# Patient Record
Sex: FEMALE | Race: WHITE | NOT HISPANIC OR LATINO
[De-identification: names, ages, dates, MRNs, and addresses within clinical notes are randomized per-mention and may not be internally consistent; named-entity substitution may affect disease eponyms.]

---

## 2023-02-07 PROBLEM — Z00.00 ENCOUNTER FOR PREVENTIVE HEALTH EXAMINATION: Status: ACTIVE | Noted: 2023-02-07

## 2023-02-09 ENCOUNTER — APPOINTMENT (OUTPATIENT)
Dept: ORTHOPEDIC SURGERY | Facility: CLINIC | Age: 83
End: 2023-02-09
Payer: MEDICARE

## 2023-02-09 VITALS — BODY MASS INDEX: 27.6 KG/M2 | WEIGHT: 150 LBS | HEIGHT: 62 IN

## 2023-02-09 DIAGNOSIS — E78.00 PURE HYPERCHOLESTEROLEMIA, UNSPECIFIED: ICD-10-CM

## 2023-02-09 DIAGNOSIS — Z78.9 OTHER SPECIFIED HEALTH STATUS: ICD-10-CM

## 2023-02-09 DIAGNOSIS — Z87.891 PERSONAL HISTORY OF NICOTINE DEPENDENCE: ICD-10-CM

## 2023-02-09 DIAGNOSIS — I10 ESSENTIAL (PRIMARY) HYPERTENSION: ICD-10-CM

## 2023-02-09 DIAGNOSIS — Z86.69 PERSONAL HISTORY OF OTHER DISEASES OF THE NERVOUS SYSTEM AND SENSE ORGANS: ICD-10-CM

## 2023-02-09 DIAGNOSIS — E11.9 TYPE 2 DIABETES MELLITUS W/OUT COMPLICATIONS: ICD-10-CM

## 2023-02-09 DIAGNOSIS — Z86.39 PERSONAL HISTORY OF OTHER ENDOCRINE, NUTRITIONAL AND METABOLIC DISEASE: ICD-10-CM

## 2023-02-09 DIAGNOSIS — Z85.3 PERSONAL HISTORY OF MALIGNANT NEOPLASM OF BREAST: ICD-10-CM

## 2023-02-09 DIAGNOSIS — Z85.118 PERSONAL HISTORY OF OTHER MALIGNANT NEOPLASM OF BRONCHUS AND LUNG: ICD-10-CM

## 2023-02-09 PROCEDURE — 99214 OFFICE O/P EST MOD 30 MIN: CPT | Mod: 25

## 2023-02-09 PROCEDURE — 27520 TREAT KNEECAP FRACTURE: CPT

## 2023-02-09 PROCEDURE — L1833: CPT | Mod: KX,LT

## 2023-02-09 RX ORDER — ATORVASTATIN CALCIUM 80 MG/1
80 TABLET, FILM COATED ORAL
Refills: 0 | Status: ACTIVE | COMMUNITY

## 2023-02-09 RX ORDER — LEVOTHYROXINE SODIUM 0.11 MG/1
112 TABLET ORAL
Refills: 0 | Status: ACTIVE | COMMUNITY

## 2023-02-09 RX ORDER — UMECLIDINIUM BROMIDE AND VILANTEROL TRIFENATATE 62.5; 25 UG/1; UG/1
62.5-25 POWDER RESPIRATORY (INHALATION)
Refills: 0 | Status: ACTIVE | COMMUNITY

## 2023-02-09 RX ORDER — EZETIMIBE 10 MG/1
10 TABLET ORAL
Refills: 0 | Status: ACTIVE | COMMUNITY

## 2023-02-09 RX ORDER — SERTRALINE HYDROCHLORIDE 100 MG/1
100 TABLET, FILM COATED ORAL
Refills: 0 | Status: ACTIVE | COMMUNITY

## 2023-02-09 RX ORDER — INSULIN LISPRO 100 [IU]/ML
100 INJECTION, SOLUTION INTRAVENOUS; SUBCUTANEOUS
Refills: 0 | Status: ACTIVE | COMMUNITY

## 2023-02-09 RX ORDER — ROPINIROLE 4 MG/1
4 TABLET, FILM COATED ORAL
Refills: 0 | Status: ACTIVE | COMMUNITY

## 2023-02-09 RX ORDER — METOPROLOL SUCCINATE 50 MG/1
50 TABLET, EXTENDED RELEASE ORAL
Refills: 0 | Status: ACTIVE | COMMUNITY

## 2023-02-09 RX ORDER — CEPHALEXIN 500 MG/1
500 CAPSULE ORAL
Refills: 0 | Status: ACTIVE | COMMUNITY

## 2023-02-09 RX ORDER — INSULIN GLARGINE 300 U/ML
300 INJECTION, SOLUTION SUBCUTANEOUS
Refills: 0 | Status: ACTIVE | COMMUNITY

## 2023-02-09 NOTE — HISTORY OF PRESENT ILLNESS
[Sudden] : sudden [0] : 0 [8] : 8 [Burning] : burning [Dull/Aching] : dull/aching [Throbbing] : throbbing [Intermittent] : intermittent [Nothing helps with pain getting better] : Nothing helps with pain getting better [Lying in bed] : lying in bed [Retired] : Work status: retired [de-identified] : Patient presents today with left knee fracture after slipping and falling at home on 2/4/23. Went to [facility], had left knee xray and LE CT, was given brace and advised to follow up with orthopedic. Experiencing localized pain. Admits to having swelling. Taking Tylenol with no relief. Using a wheelchair for ambulation today, uses a walker at home.  [] : no [FreeTextEntry1] : left knee [FreeTextEntry3] : 2/4/23 [FreeTextEntry5] : slipping and falling at home [de-identified] : left knee xray and LE CT at [facility]

## 2023-02-09 NOTE — DATA REVIEWED
[CT Scan] : CT scan [Left] : left [Knee] : knee [Report was reviewed and noted in the chart] : The report was reviewed and noted in the chart [I independently reviewed and interpreted images and report] : I independently reviewed and interpreted images and report [FreeTextEntry1] : Comminuted, nondisplaced intra-articular fracture of the inferior aspect of the left patella.\par

## 2023-02-09 NOTE — ASSESSMENT
[FreeTextEntry1] : Comminuted, nondisplaced intra-articular fracture of the inferior aspect of the left patella.\par \par Patient given long hinged knee brace \par \par NWB left LE \par \par Recommend: - rest - ice - compression - elevation \par \par Follow up in 6 weeks

## 2023-02-12 ENCOUNTER — FORM ENCOUNTER (OUTPATIENT)
Age: 83
End: 2023-02-12

## 2023-02-14 ENCOUNTER — FORM ENCOUNTER (OUTPATIENT)
Age: 83
End: 2023-02-14

## 2023-02-19 ENCOUNTER — FORM ENCOUNTER (OUTPATIENT)
Age: 83
End: 2023-02-19

## 2023-03-23 ENCOUNTER — RESULT CHARGE (OUTPATIENT)
Age: 83
End: 2023-03-23

## 2023-03-23 ENCOUNTER — APPOINTMENT (OUTPATIENT)
Dept: ORTHOPEDIC SURGERY | Facility: CLINIC | Age: 83
End: 2023-03-23
Payer: MEDICARE

## 2023-03-23 VITALS — BODY MASS INDEX: 27.6 KG/M2 | HEIGHT: 62 IN | WEIGHT: 150 LBS

## 2023-03-23 PROCEDURE — 73562 X-RAY EXAM OF KNEE 3: CPT | Mod: LT

## 2023-03-23 PROCEDURE — 99024 POSTOP FOLLOW-UP VISIT: CPT

## 2023-03-23 RX ORDER — ACETAMINOPHEN AND CODEINE 300; 30 MG/1; MG/1
300-30 TABLET ORAL
Qty: 30 | Refills: 0 | Status: ACTIVE | COMMUNITY
Start: 2023-02-10 | End: 1900-01-01

## 2023-03-23 NOTE — ASSESSMENT
[FreeTextEntry1] : Comminuted, nondisplaced intra-articular fracture of the inferior aspect of the left patella.\par \par Patient has not used long hinged knee brace locked in extension as instructed. Patient did not follow recommendations to immobilize left knee. Patient has been bearing weight, extending, flexing. \par \par Patient will begin physical therapy.\par \par Patient going on vacation May 7th. Recommended patient RTO if pain returns prior to leaving.\par \par Follow up in 2 months

## 2023-03-23 NOTE — IMAGING
Temp:  [98 °F (36.7 °C)] 98 °F (36.7 °C)  Heart Rate:  [99] 99  Resp:  [22] 22  BP: (108)/(69) 108/69     [Left] : left knee [AP] : anteroposterior [Lateral] : lateral [FreeTextEntry9] : Fracture in acceptable alignment

## 2023-04-04 ENCOUNTER — FORM ENCOUNTER (OUTPATIENT)
Age: 83
End: 2023-04-04

## 2023-04-06 ENCOUNTER — FORM ENCOUNTER (OUTPATIENT)
Age: 83
End: 2023-04-06

## 2023-05-26 ENCOUNTER — APPOINTMENT (OUTPATIENT)
Dept: ORTHOPEDIC SURGERY | Facility: CLINIC | Age: 83
End: 2023-05-26
Payer: MEDICARE

## 2023-05-26 VITALS — WEIGHT: 150 LBS | BODY MASS INDEX: 27.6 KG/M2 | HEIGHT: 62 IN

## 2023-05-26 PROCEDURE — 99214 OFFICE O/P EST MOD 30 MIN: CPT

## 2023-05-26 NOTE — ASSESSMENT
[FreeTextEntry1] : Comminuted, nondisplaced intra-articular fracture of the inferior aspect of the left patella.\par \par Patient will continue physical therapy.\par \par Recommend: - rest - ice - compression - elevation \par \par Follow up in 2 months

## 2023-05-26 NOTE — HISTORY OF PRESENT ILLNESS
[Sudden] : sudden [0] : 0 [8] : 8 [Burning] : burning [Dull/Aching] : dull/aching [Throbbing] : throbbing [Intermittent] : intermittent [Nothing helps with pain getting better] : Nothing helps with pain getting better [Lying in bed] : lying in bed [Retired] : Work status: retired [de-identified] : Follow up left knee. Patient is doing well, and is not experiencing any pain at this time.  Admits to going to PT 3x a week with some relief, patient stopped PT to travel, and is now resuming it next Tuesday. Denies any pain medication.  [] : no [FreeTextEntry1] : left knee [FreeTextEntry3] : 2/4/23 [FreeTextEntry5] : slipping and falling at home [de-identified] : left knee xray and LE CT at [facility]

## 2023-07-27 ENCOUNTER — APPOINTMENT (OUTPATIENT)
Dept: ORTHOPEDIC SURGERY | Facility: CLINIC | Age: 83
End: 2023-07-27
Payer: MEDICARE

## 2023-07-27 VITALS — WEIGHT: 150 LBS | HEIGHT: 62 IN | BODY MASS INDEX: 27.6 KG/M2

## 2023-07-27 DIAGNOSIS — S82.002A UNSPECIFIED FRACTURE OF LEFT PATELLA, INITIAL ENCOUNTER FOR CLOSED FRACTURE: ICD-10-CM

## 2023-07-27 PROCEDURE — 99214 OFFICE O/P EST MOD 30 MIN: CPT

## 2023-07-27 NOTE — ASSESSMENT
[FreeTextEntry1] : Comminuted, nondisplaced intra-articular fracture of the inferior aspect of the left patella.\par \par Patient doing well, ASX at this time. \par \par Recommend: - rest - ice - compression - elevation \par \par Follow up PRN\par

## 2023-07-27 NOTE — HISTORY OF PRESENT ILLNESS
[Sudden] : sudden [0] : 0 [8] : 8 [Burning] : burning [Dull/Aching] : dull/aching [Throbbing] : throbbing [Intermittent] : intermittent [Nothing helps with pain getting better] : Nothing helps with pain getting better [Lying in bed] : lying in bed [Retired] : Work status: retired [de-identified] : Follow up left knee. Patient states she is doing well, has no pain or discomfort. Patient states she finished PT with significant relief. Patient states she went to [place] and had no issues walking. Denies any pain medication.  [] : no [FreeTextEntry1] : left knee [FreeTextEntry3] : 2/4/23 [FreeTextEntry5] : slipping and falling at home [de-identified] : left knee xray and LE CT at [facility]